# Patient Record
(demographics unavailable — no encounter records)

---

## 2017-12-06 RX ORDER — LORAZEPAM 0.5 MG/1
TABLET ORAL
OUTPATIENT
Start: 2017-12-06

## 2017-12-06 NOTE — TELEPHONE ENCOUNTER
Patient said she ran out of her Lorazepam, she is requesting that you call it in today. If this can be called in to the pharmacy, that info is verified.

## 2021-08-03 PROBLEM — I10 HYPERTENSION: Status: RESOLVED | Noted: 2021-08-03 | Resolved: 2021-08-03
